# Patient Record
Sex: MALE | Race: WHITE | URBAN - METROPOLITAN AREA
[De-identification: names, ages, dates, MRNs, and addresses within clinical notes are randomized per-mention and may not be internally consistent; named-entity substitution may affect disease eponyms.]

---

## 2017-02-07 ENCOUNTER — TELEPHONE (OUTPATIENT)
Dept: FAMILY MEDICINE | Facility: CLINIC | Age: 32
End: 2017-02-07

## 2017-02-07 NOTE — TELEPHONE ENCOUNTER
Panel Management Review      Patient has the following on his problem list:     Hypertension   Last three blood pressure readings:  BP Readings from Last 3 Encounters:   03/08/16 150/82   10/31/14 136/86   11/01/13 114/78     Blood pressure: Failed    HTN Guidelines:  Age 18-59 BP range:  Less than 140/90  Age 60-85 with Diabetes:  Less than 140/90  Age 60-85 without Diabetes:  less than 150/90      Composite cancer screening  Chart review shows that this patient is due/due soon for the following None  Summary:    Patient is due/failing the following:   BP CHECK and PHYSICAL    Action needed:   Patient needs office visit for BP check and physical.    Type of outreach:    Sent letter.    Questions for provider review:    None                                                                                                                                    Jose Garcia MA     Chart routed to closed.

## 2017-02-07 NOTE — Clinical Note
[facility]  [address] [facility] [id number] [phone]      February 7, 2017      Julio Cardoza  [address] [facility] [address]        Dear Julio,    Our records indicate that you have not scheduled for a(n)Blood pressure and physical appointment which was recommended by your health care team. Monitoring and managing your preventative and chronic health conditions are very important to us.    If you have received your health care elsewhere, please provide us with that information so it can be documented in your chart.    Please call [phone] or message us through your [employer] account to schedule an appointment or provide information for your chart.     I look forward to seeing you and working with you on your health care needs.     Sincerely,       ELMIRA Marcano               *If you have already scheduled an appointment, please disregard this reminder

## 2017-04-21 ENCOUNTER — OFFICE VISIT (OUTPATIENT)
Dept: FAMILY MEDICINE | Facility: CLINIC | Age: 32
End: 2017-04-21
Payer: OTHER GOVERNMENT

## 2017-04-21 VITALS
BODY MASS INDEX: 25.47 KG/M2 | WEIGHT: 170 LBS | HEART RATE: 64 BPM | SYSTOLIC BLOOD PRESSURE: 127 MMHG | DIASTOLIC BLOOD PRESSURE: 80 MMHG | OXYGEN SATURATION: 99 %

## 2017-04-21 DIAGNOSIS — I10 HYPERTENSION, GOAL BELOW 140/90: ICD-10-CM

## 2017-04-21 DIAGNOSIS — R21 RASH: Primary | ICD-10-CM

## 2017-04-21 LAB
ANION GAP SERPL CALCULATED.3IONS-SCNC: 5 MMOL/L (ref 3–14)
BUN SERPL-MCNC: 15 MG/DL (ref 7–30)
CALCIUM SERPL-MCNC: 9 MG/DL (ref 8.5–10.1)
CHLORIDE SERPL-SCNC: 105 MMOL/L (ref 94–109)
CO2 SERPL-SCNC: 30 MMOL/L (ref 20–32)
CREAT SERPL-MCNC: 1.18 MG/DL (ref 0.66–1.25)
GFR SERPL CREATININE-BSD FRML MDRD: 71 ML/MIN/1.7M2
GLUCOSE SERPL-MCNC: 100 MG/DL (ref 70–99)
POTASSIUM SERPL-SCNC: 4.9 MMOL/L (ref 3.4–5.3)
SODIUM SERPL-SCNC: 140 MMOL/L (ref 133–144)

## 2017-04-21 PROCEDURE — 80048 BASIC METABOLIC PNL TOTAL CA: CPT | Performed by: PHYSICIAN ASSISTANT

## 2017-04-21 PROCEDURE — 36415 COLL VENOUS BLD VENIPUNCTURE: CPT | Performed by: PHYSICIAN ASSISTANT

## 2017-04-21 PROCEDURE — 99214 OFFICE O/P EST MOD 30 MIN: CPT | Performed by: PHYSICIAN ASSISTANT

## 2017-04-21 RX ORDER — TRIAMCINOLONE ACETONIDE 5 MG/G
CREAM TOPICAL
Qty: 30 G | Refills: 1 | Status: SHIPPED | OUTPATIENT
Start: 2017-04-21

## 2017-04-21 RX ORDER — LISINOPRIL 10 MG/1
10 TABLET ORAL DAILY
Qty: 90 TABLET | Refills: 1 | Status: SHIPPED | OUTPATIENT
Start: 2017-04-21 | End: 2018-01-11

## 2017-04-21 NOTE — MR AVS SNAPSHOT
"              After Visit Summary   2017    Julio Cardoza    MRN: [id number]           Patient Information     Date Of Birth          1985        Visit Information        Provider Department      2017 2:50 PM Shadi Cabrera PA-C [facility]        Today's Diagnoses     Rash    -  1    Hypertension, goal below 140/90           Follow-ups after your visit        Who to contact     If you have questions or need follow up information about today's clinic visit or your schedule please contact [facility] directly at [phone].  Normal or non-critical lab and imaging results will be communicated to you by [employer], letter or phone within 4 business days after the clinic has received the results. If you do not hear from us within 7 days, please contact the clinic through [employer] or phone. If you have a critical or abnormal lab result, we will notify you by phone as soon as possible.  Submit refill requests through "[employer]" or call your pharmacy and they will forward the refill request to us. Please allow 3 business days for your refill to be completed.          Additional Information About Your Visit        MyChart Information     "[employer]" lets you send messages to your doctor, view your test results, renew your prescriptions, schedule appointments and more. To sign up, go to [email or web address]/"[employer]" . Click on \"Log in\" on the left side of the screen, which will take you to the Welcome page. Then click on \"Sign up Now\" on the right side of the page.     You will be asked to enter the access code listed below, as well as some personal information. Please follow the directions to create your username and password.     Your access code is: [email or web address]  Expires: 2017  3:11 PM     Your access code will  in 90 days. If you need help or a new code, please call your [facility] or [phone].        Care EveryWhere ID     This is your Care EveryWhere ID. This " could be used by other organizations to access your [place] medical records  [id number]        Your Vitals Were     Pulse Pulse Oximetry BMI (Body Mass Index)             64 99% 25.47 kg/m2          Blood Pressure from Last 3 Encounters:   04/21/17 127/80   03/08/16 150/82   10/31/14 136/86    Weight from Last 3 Encounters:   04/21/17 170 lb (77.1 kg)   03/08/16 181 lb 8 oz (82.3 kg)   10/31/14 183 lb (83 kg)              We Performed the Following     Basic metabolic panel          Today's Medication Changes          These changes are accurate as of: 4/21/17  3:12 PM.  If you have any questions, ask your nurse or doctor.               Start taking these medicines.        Dose/Directions    triamcinolone 0.5 % cream   Commonly known as:  KENALOG   Used for:  Rash   Started by:  Shadi Cabrera PA-C        Apply sparingly to affected area three times daily.   Quantity:  30 g   Refills:  1            Where to get your medicines      These medications were sent to [place] [address] [place] [facility] - [place], [place] - [address]  [address] [facility] [address]     Phone:  [phone]     lisinopril 10 MG tablet    triamcinolone 0.5 % cream                Primary Care Provider Office Phone # Fax #    Shadi Cabrera PA-C [phone]       [facility] [id number] [place] [address]        Thank you!     Thank you for choosing [facility] [place]  for your care. Our goal is always to provide you with excellent care. Hearing back from our patients is one way we can continue to improve our services. Please take a few minutes to complete the written survey that you may receive in the mail after your visit with us. Thank you!             Your Updated Medication List - Protect others around you: Learn how to safely use, store and throw away your medicines at www.disposemymeds.org.          This list is accurate as of: 4/21/17  3:12 PM.  Always use your most recent  med list.                   Brand Name Dispense Instructions for use    lisinopril 10 MG tablet    PRINIVIL/ZESTRIL    90 tablet    Take 1 tablet (10 mg) by mouth daily       montelukast 10 MG tablet    SINGULAIR    30 tablet    Take 1 tablet (10 mg) by mouth At Bedtime       triamcinolone 0.5 % cream    KENALOG    30 g    Apply sparingly to affected area three times daily.

## 2017-04-21 NOTE — PROGRESS NOTES
SUBJECTIVE:                                                    Julio Cardoza is a 31 year old male who presents to clinic today for the following health issues:    Hypertension Follow-up      Outpatient blood pressures are not being checked. Will be getting home cuff soon to start checking    Low Salt Diet: low salt       Amount of exercise or physical activity: runs 15 miles a week plus working out an additional 3 days a week    Problems taking medications regularly: No    Medication side effects: none    Diet: low salt, no alcohol, eating healthier options. Has lost 10 lbs in the last 6 weeks    Picking skin of forearms for couple months. Itches. No fevers or discharge. Get board at work and will pick skin.     Problem list and histories reviewed & adjusted, as indicated.  Additional history: as documented      Patient Active Problem List   Diagnosis     Hyperlipidemia LDL goal <160     Hypertension, goal below 140/90     Allergic rhinitis, unspecified allergic rhinitis type     Past Surgical History:   Procedure Laterality Date     ENT SURGERY         Social History   Substance Use Topics     Smoking status: Never Smoker     Smokeless tobacco: Never Used     Alcohol use Yes      Comment: OCC     Family History   Problem Relation Age of Onset     HEART DISEASE Mother      Hypertension Father      CANCER Maternal Grandmother      Hypertension Brother      Hypertension Sister          Current Outpatient Prescriptions   Medication Sig Dispense Refill     lisinopril (PRINIVIL/ZESTRIL) 10 MG tablet Take 1 tablet (10 mg) by mouth daily 90 tablet 1     triamcinolone (KENALOG) 0.5 % cream Apply sparingly to affected area three times daily. 30 g 1     montelukast (SINGULAIR) 10 MG tablet Take 1 tablet (10 mg) by mouth At Bedtime (Patient not taking: Reported on 4/21/2017) 30 tablet 5     [DISCONTINUED] lisinopril (PRINIVIL,ZESTRIL) 10 MG tablet Take 1 tablet (10 mg) by mouth daily 90 tablet 1     Allergies   Allergen  Reactions     Bactrim [Sulfamethoxazole W-Trimethoprim]      BP Readings from Last 3 Encounters:   04/21/17 127/80   03/08/16 150/82   10/31/14 136/86    Wt Readings from Last 3 Encounters:   04/21/17 170 lb (77.1 kg)   03/08/16 181 lb 8 oz (82.3 kg)   10/31/14 183 lb (83 kg)                  Labs reviewed in EPIC    ROS:  Constitutional, HEENT, cardiovascular, pulmonary, gi and gu systems are negative, except as otherwise noted.    OBJECTIVE:                                                    /80  Pulse 64  Wt 170 lb (77.1 kg)  SpO2 99%  BMI 25.47 kg/m2  Body mass index is 25.47 kg/(m^2).  GENERAL: healthy, alert and no distress  EYES: Eyes grossly normal to inspection, PERRL and conjunctivae and sclerae normal  HENT: ear canals and TM's normal, nose and mouth without ulcers or lesions  NECK: no adenopathy, no asymmetry, masses, or scars and thyroid normal to palpation  RESP: lungs clear to auscultation - no rales, rhonchi or wheezes  CV: regular rate and rhythm, normal S1 S2, no S3 or S4, no murmur, click or rub, no peripheral edema and peripheral pulses strong  MS: no gross musculoskeletal defects noted, no edema  SKIN: few scattered small scabs and papules twice a day forearms.     Diagnostic Test Results:  No results found for this or any previous visit (from the past 24 hour(s)).     ASSESSMENT/PLAN:                                                        ICD-10-CM    1. Rash R21 triamcinolone (KENALOG) 0.5 % cream   2. Hypertension, goal below 140/90 I10 lisinopril (PRINIVIL/ZESTRIL) 10 MG tablet     Basic metabolic panel   med refilled  Start crm twice a day and lotion skin twice a day  warning signs discussed.  side effects discussed  Recheck blood pressure in 6 months.     Shadi Cabrera PA-C  [facility]

## 2017-04-21 NOTE — LETTER
[facility]  [address] [facility] [address] [phone]        April 24, 2017    Julio Cardoza  [address] [facility] [address]            Mr. Cardoza,     All of your labs were normal for you.     Please contact the clinic if you have additional questions.  Thank you.     Sincerely,     Shadi Cabrera PA-C    Results for orders placed or performed in visit on 04/21/17   Basic metabolic panel   Result Value Ref Range    Sodium 140 133 - 144 mmol/L    Potassium 4.9 3.4 - 5.3 mmol/L    Chloride 105 94 - 109 mmol/L    Carbon Dioxide 30 20 - 32 mmol/L    Anion Gap 5 3 - 14 mmol/L    Glucose 100 (H) 70 - 99 mg/dL    Urea Nitrogen 15 7 - 30 mg/dL    Creatinine 1.18 0.66 - 1.25 mg/dL    GFR Estimate 71 >60 mL/min/1.7m2    GFR Estimate If Black 86 >60 mL/min/1.7m2    Calcium 9.0 8.5 - 10.1 mg/dL

## 2017-04-21 NOTE — NURSING NOTE
"Chief Complaint   Patient presents with     Hypertension       Initial /80  Pulse 64  Wt 170 lb (77.1 kg)  SpO2 99%  BMI 25.47 kg/m2 Estimated body mass index is 25.47 kg/(m^2) as calculated from the following:    Height as of 10/31/14: 5' 8.5\" (1.74 m).    Weight as of this encounter: 170 lb (77.1 kg).  Medication Reconciliation: complete  Nuzhat Ford CMA    "

## 2017-04-24 NOTE — PROGRESS NOTES
MrErnestina Cardoza,    All of your labs were normal for you.    Please contact the clinic if you have additional questions.  Thank you.    Sincerely,    Shadi Cabrera PA-C

## 2017-11-03 ENCOUNTER — OFFICE VISIT (OUTPATIENT)
Dept: FAMILY MEDICINE | Facility: CLINIC | Age: 32
End: 2017-11-03
Payer: OTHER GOVERNMENT

## 2017-11-03 ENCOUNTER — TELEPHONE (OUTPATIENT)
Dept: FAMILY MEDICINE | Facility: CLINIC | Age: 32
End: 2017-11-03

## 2017-11-03 VITALS
OXYGEN SATURATION: 100 % | BODY MASS INDEX: 26.22 KG/M2 | SYSTOLIC BLOOD PRESSURE: 134 MMHG | HEART RATE: 61 BPM | WEIGHT: 175 LBS | TEMPERATURE: 98.3 F | DIASTOLIC BLOOD PRESSURE: 80 MMHG

## 2017-11-03 DIAGNOSIS — J45.21 MILD INTERMITTENT ASTHMA WITH ACUTE EXACERBATION: ICD-10-CM

## 2017-11-03 DIAGNOSIS — J01.90 ACUTE SINUSITIS WITH SYMPTOMS > 10 DAYS: Primary | ICD-10-CM

## 2017-11-03 PROCEDURE — 99214 OFFICE O/P EST MOD 30 MIN: CPT | Performed by: PHYSICIAN ASSISTANT

## 2017-11-03 RX ORDER — ALBUTEROL SULFATE 90 UG/1
2 AEROSOL, METERED RESPIRATORY (INHALATION) EVERY 6 HOURS PRN
Qty: 1 INHALER | Refills: 1 | Status: SHIPPED | OUTPATIENT
Start: 2017-11-03 | End: 2018-03-13

## 2017-11-03 ASSESSMENT — ENCOUNTER SYMPTOMS
MUSCULOSKELETAL NEGATIVE: 1
EYES NEGATIVE: 1
CONSTITUTIONAL NEGATIVE: 1
CARDIOVASCULAR NEGATIVE: 1
PSYCHIATRIC NEGATIVE: 1
HEMATOLOGIC/LYMPHATIC NEGATIVE: 1
GASTROINTESTINAL NEGATIVE: 1
ENDOCRINE NEGATIVE: 1

## 2017-11-03 NOTE — TELEPHONE ENCOUNTER
Reason for Call:  Same Day Appointment, Requested Provider:  [facility] in [place]      PCP: Shadi Cabrera    Reason for visit: cold sx     Duration of symptoms: 3 weesk     Have you been treated for this in the past? No    Additional comments: Pt would like to be seen today. His son is seeing Peds at 130, he is hoping to be seen around the same time.     Can we leave a detailed message on this number? YES    Phone number patient can be reached at: Home number on file [phone] (home)    Best Time: any     Call taken on 11/3/2017 at 11:56 AM by Benita Scott

## 2017-11-03 NOTE — NURSING NOTE
"Chief Complaint   Patient presents with     Cough     X 3 weeks, congestion, wheezing       Initial /80  Pulse 61  Temp 98.3  F (36.8  C) (Oral)  Wt 175 lb (79.4 kg)  SpO2 100%  BMI 26.22 kg/m2 Estimated body mass index is 26.22 kg/(m^2) as calculated from the following:    Height as of 10/31/14: 5' 8.5\" (1.74 m).    Weight as of this encounter: 175 lb (79.4 kg).  Medication Reconciliation: complete   Lashon South CMA      "

## 2017-11-03 NOTE — MR AVS SNAPSHOT
"              After Visit Summary   11/3/2017    Julio Cardoza    MRN: [id number]           Patient Information     Date Of Birth          1985        Visit Information        Provider Department      11/3/2017 4:00 PM Zohra Bello PA-C [facility]        Today's Diagnoses     Acute sinusitis with symptoms > 10 days    -  1    Mild intermittent asthma with acute exacerbation           Follow-ups after your visit        Who to contact     If you have questions or need follow up information about today's clinic visit or your schedule please contact [facility] directly at [phone].  Normal or non-critical lab and imaging results will be communicated to you by [employer], letter or phone within 4 business days after the clinic has received the results. If you do not hear from us within 7 days, please contact the clinic through [employer] or phone. If you have a critical or abnormal lab result, we will notify you by phone as soon as possible.  Submit refill requests through [employer] or call your pharmacy and they will forward the refill request to us. Please allow 3 business days for your refill to be completed.          Additional Information About Your Visit        MyChart Information     [employer] lets you send messages to your doctor, view your test results, renew your prescriptions, schedule appointments and more. To sign up, go to [email or web address] . Click on \"Log in\" on the left side of the screen, which will take you to the Welcome page. Then click on \"Sign up Now\" on the right side of the page.     You will be asked to enter the access code listed below, as well as some personal information. Please follow the directions to create your username and password.     Your access code is: [email or web address]  Expires: 2018  7:59 PM     Your access code will  in 90 days. If you need help or a new code, please call your [facility] or [phone].        Care " EveryWhere ID     This is your Care EveryWhere ID. This could be used by other organizations to access your [place] medical records  [id number]        Your Vitals Were     Pulse Temperature Pulse Oximetry BMI (Body Mass Index)          61 98.3  F (36.8  C) (Oral) 100% 26.22 kg/m2         Blood Pressure from Last 3 Encounters:   11/03/17 134/80   04/21/17 127/80   03/08/16 150/82    Weight from Last 3 Encounters:   11/03/17 175 lb (79.4 kg)   04/21/17 170 lb (77.1 kg)   03/08/16 181 lb 8 oz (82.3 kg)              Today, you had the following     No orders found for display         Today's Medication Changes          These changes are accurate as of: 11/3/17  7:59 PM.  If you have any questions, ask your nurse or doctor.               Start taking these medicines.        Dose/Directions    albuterol 108 (90 BASE) MCG/ACT Inhaler   Commonly known as:  PROAIR HFA/PROVENTIL HFA/VENTOLIN HFA   Used for:  Mild intermittent asthma with acute exacerbation   Started by:  Zohra Bello PA-C        Dose:  2 puff   Inhale 2 puffs into the lungs every 6 hours as needed for shortness of breath / dyspnea or wheezing   Quantity:  1 Inhaler   Refills:  1       amoxicillin-clavulanate 875-125 MG per tablet   Commonly known as:  AUGMENTIN   Used for:  Acute sinusitis with symptoms > 10 days   Started by:  Zohra Bello PA-C        Dose:  1 tablet   Take 1 tablet by mouth 2 times daily   Quantity:  20 tablet   Refills:  0            Where to get your medicines      These medications were sent to [facility] [address] [place] [facility] [address] [facility]  [address] [address]     Phone:  [phone]     albuterol 108 (90 BASE) MCG/ACT Inhaler    amoxicillin-clavulanate 875-125 MG per tablet                Primary Care Provider Office Phone # Fax #    Shadi Cabrera PA-C [phone] [address] [facility] [address]        Equal Access to Services      SHARMAINE [facility]: Hadii aad ku cesar Malagon, waaxda luqadaha, qaybta kaalmada adeliliana, margarita nimoin hayaatami nagyfer fontaine em . So [facility] [phone].    ATENCIÓN: Si nikola loraine, tiene a hinton disposición servicios gratuitos de asistencia lingüística. Llame al [phone].    We comply with applicable federal civil rights laws and [place] laws. We do not discriminate on the basis of race, color, national origin, age, disability, sex, sexual orientation, or gender identity.            Thank you!     Thank you for choosing [facility]  [facility]. Our goal is always to provide you with excellent care. Hearing back from our patients is one way we can continue to improve our services. Please take a few minutes to complete the written survey that you may receive in the mail after your visit with us. Thank you!             Your Updated Medication List - Protect others around you: Learn how to safely use, store and throw away your medicines at www.disposemymeds.org.          This list is accurate as of: 11/3/17  7:59 PM.  Always use your most recent med list.                   Brand Name Dispense Instructions for use Diagnosis    albuterol 108 (90 BASE) MCG/ACT Inhaler    PROAIR HFA/PROVENTIL HFA/VENTOLIN HFA    1 Inhaler    Inhale 2 puffs into the lungs every 6 hours as needed for shortness of breath / dyspnea or wheezing    Mild intermittent asthma with acute exacerbation       amoxicillin-clavulanate 875-125 MG per tablet    AUGMENTIN    20 tablet    Take 1 tablet by mouth 2 times daily    Acute sinusitis with symptoms > 10 days       lisinopril 10 MG tablet    PRINIVIL/ZESTRIL    90 tablet    Take 1 tablet (10 mg) by mouth daily    Hypertension, goal below 140/90       montelukast 10 MG tablet    SINGULAIR    30 tablet    Take 1 tablet (10 mg) by mouth At Bedtime    Allergic rhinitis, unspecified allergic rhinitis type       triamcinolone 0.5 % cream    KENALOG    30 g    Apply sparingly to  affected area three times daily.    Rash

## 2017-11-03 NOTE — PROGRESS NOTES
SUBJECTIVE:   Julio Cardoza is a 32 year old male who presents to clinic today for the following health issues:      RESPIRATORY SYMPTOMS      Duration: X3 weeks    Description  nasal congestion, rhinorrhea, facial pain/pressure, cough and wheezing    Severity: moderate    Accompanying signs and symptoms: None    History (predisposing factors):  Hx of allergies    Precipitating or alleviating factors: None    Therapies tried and outcome:  Mucinex    Facial pressure is starting to improve  Coughing up phlegm  He has postnasal drip and a cough in the morning  Worse when running last night  He is wheezing at night - he has a history of asthma  He cannot find his albuterol inhaler    Problem list and histories reviewed & adjusted, as indicated.  Additional history: as documented    Patient Active Problem List   Diagnosis     Hyperlipidemia LDL goal <160     Hypertension, goal below 140/90     Allergic rhinitis, unspecified allergic rhinitis type     Past Surgical History:   Procedure Laterality Date     ENT SURGERY         Social History   Substance Use Topics     Smoking status: Never Smoker     Smokeless tobacco: Never Used     Alcohol use Yes      Comment: OCC     Family History   Problem Relation Age of Onset     HEART DISEASE Mother      Hypertension Father      CANCER Maternal Grandmother      Hypertension Brother      Hypertension Sister          Current Outpatient Prescriptions   Medication Sig Dispense Refill     lisinopril (PRINIVIL/ZESTRIL) 10 MG tablet Take 1 tablet (10 mg) by mouth daily 90 tablet 1     triamcinolone (KENALOG) 0.5 % cream Apply sparingly to affected area three times daily. (Patient not taking: Reported on 11/3/2017) 30 g 1     montelukast (SINGULAIR) 10 MG tablet Take 1 tablet (10 mg) by mouth At Bedtime (Patient not taking: Reported on 4/21/2017) 30 tablet 5     Allergies   Allergen Reactions     Bactrim [Sulfamethoxazole W-Trimethoprim]          Reviewed and updated as needed this  visit by clinical staff     Reviewed and updated as needed this visit by Provider         Review of Systems   Constitutional: Negative.    HENT:        As in HPI   Eyes: Negative.    Respiratory:        As in HPI   Cardiovascular: Negative.    Gastrointestinal: Negative.    Endocrine: Negative.    Genitourinary: Negative.    Musculoskeletal: Negative.    Skin: Negative.    Hematological: Negative.    Psychiatric/Behavioral: Negative.          OBJECTIVE:     /80  Pulse 61  Temp 98.3  F (36.8  C) (Oral)  Wt 175 lb (79.4 kg)  SpO2 100%  BMI 26.22 kg/m2  Body mass index is 26.22 kg/(m^2).  GENERAL: healthy, alert and no distress  EYES: Eyes grossly normal to inspection, PERRL and conjunctivae and sclerae normal  HENT: ear canals and TM's normal, nose and mouth without ulcers or lesions  NECK: no adenopathy, no asymmetry, masses  RESP: lungs clear to auscultation - no rales, rhonchi or wheezes  CV: regular rate and rhythm, normal S1 S2, no murmur  MS: no gross musculoskeletal defects noted, no edema  SKIN: no suspicious lesions or rashes  NEURO: Normal strength and tone, mentation intact and speech normal    Diagnostic Test Results:  none     ASSESSMENT/PLAN:     1. Acute sinusitis with symptoms > 10 days  - amoxicillin-clavulanate (AUGMENTIN) 875-125 MG per tablet; Take 1 tablet by mouth 2 times daily  Dispense: 20 tablet; Refill: 0    2. Mild intermittent asthma with acute exacerbation  - albuterol (PROAIR HFA/PROVENTIL HFA/VENTOLIN HFA) 108 (90 BASE) MCG/ACT Inhaler; Inhale 2 puffs into the lungs every 6 hours as needed for shortness of breath / dyspnea or wheezing  Dispense: 1 Inhaler; Refill: 1      Zohra Bello PA-C  [facility]

## 2017-11-03 NOTE — TELEPHONE ENCOUNTER
Julio Cardoza is a 32 year old male who calls with cough/cold symptoms.    NURSING ASSESSMENT:  Description: I spoke with patient. He has had a phlegmy cough with yellow/green/sometimes clear sputum.   Onset/duration:  3 weeks  Precip. factors:  Has a history of seasonal allergies and asthma.   Associated symptoms:  Sinus pressure and mild wheezing  Improves/worsens symptoms:  Was improving but went running last night and is now worse again. Taking Mucinex every several days  Allergies:   Allergies   Allergen Reactions     Bactrim [Sulfamethoxazole W-Trimethoprim]        RECOMMENDED DISPOSITION: Offered work in as requested, but would be a wait as all the providers have a meeting from 2-3. Appointment scheduled this afternoon in [place].  Will comply with recommendation: YES   If further questions/concerns or if Sx do not improve, worsen or new Sx develop, call your PCP or [facility] as soon as possible.    NOTES:  Disposition was determined by the first positive assessment question, therefore all previous assessment questions were negative.     Guideline used:  Telephone Triage Protocols for Nurses, Fifth Edition, Dorys Retana, RN, BSN

## 2017-12-12 ENCOUNTER — TELEPHONE (OUTPATIENT)
Dept: FAMILY MEDICINE | Facility: CLINIC | Age: 32
End: 2017-12-12

## 2017-12-12 NOTE — TELEPHONE ENCOUNTER
Panel Management Review      Patient has the following on his problem list:     Asthma review   No flowsheet data found.   1. Is Asthma diagnosis on the Problem List? No   2. Is Asthma listed on Health Maintenance? Yes    3. Patient is due for:  ACT and AAP    Hypertension   Last three blood pressure readings:  BP Readings from Last 3 Encounters:   11/03/17 134/80   04/21/17 127/80   03/08/16 150/82     Blood pressure: Passed    HTN Guidelines:  Age 18-59 BP range:  Less than 140/90  Age 60-85 with Diabetes:  Less than 140/90  Age 60-85 without Diabetes:  less than 150/90          Composite cancer screening  Chart review shows that this patient is due/due soon for the following None  Summary:    Patient is due/failing the following:   AAP and ACT    Action needed:   Patient needs to do ACT.    Type of outreach:    Sent letter.    Questions for provider review:    Pt needs AAP done and asthma added to problem list                                                                                                                                     Nuzhat Ford CMA       Chart routed to Provider .

## 2017-12-12 NOTE — LETTER
My Asthma Action Plan  Name: Julio Cardoza   YOB: 1985  Date: 12/12/2017   My doctor: Shadi Cabrera PA-C   My clinic: [facility]      My Control Medicine: { :980855}  My Rescue Medicine: { :013020}  {AAP include Oral Steroid:820312} My Asthma Severity: { :711638}  My Best Peak Flow Number:***  Avoid your asthma triggers: { :259124}        {Is patient a child or adult?:863160}       GREEN ZONE   Good Control    I feel good    No cough or wheeze    Can work, sleep and play without asthma symptoms    My Peak Flow number is above *** (>80% of Best)       Take your asthma control medicine every day.     1. If exercise triggers your asthma, take your rescue medication    15 minutes before exercise or sports, and    During exercise if you have asthma symptoms  2. Spacer to use with inhaler: If you have a spacer, make sure to use it with your inhaler             YELLOW ZONE Getting Worse  I have ANY of these:    I do not feel good    Cough or wheeze    Chest feels tight    Wake up at night    My Peak Flow number is between *** (50%) and *** (80%)   1. Keep taking your Green Zone medications  2. Start taking your rescue medicine:    every 20 minutes for up to 1 hour. Then every 4 hours for 24-48 hours.  3. If you stay in the Yellow Zone for more than 12-24 hours, contact your doctor.  4. If you do not return to the Green Zone in 12-24 hours or you get worse, start taking your oral steroid medicine if prescribed by your provider.           RED ZONE Medical Alert - Get Help  I have ANY of these:    I feel awful    Medicine is not helping    Breathing getting harder    Trouble walking or talking    Nose opens wide to breathe    My Peak Flow number is below *** (50%)       1. Take your rescue medicine NOW  2. If your provider has prescribed an oral steroid medicine, start taking it NOW  3. Call your doctor NOW  4. If you are still in the Red Zone after 20 minutes and you have not reached  your doctor:    Take your rescue medicine again and    Call 911 or go to the emergency room right away    See your regular doctor within 2 weeks of an Emergency Room or Urgent Care visit for follow-up treatment.        Electronically signed by: Nuzhat Gonzalez, December 12, 2017    Annual Reminders:  Meet with Asthma Educator,  Flu Shot in the Fall, consider Pneumonia Vaccination for patients with asthma (aged 19 and older).    Pharmacy:    [facility] [address] [place] [facility] [place] [place] [facility] [address] [address] [place] [facility] [address] [address] [place] [facility] [address] [facility]                    Asthma Triggers  How To Control Things That Make Your Asthma Worse    Triggers are things that make your asthma worse.  Look at the list below to help you find your triggers and what you can do about them.  You can help prevent asthma flare-ups by staying away from your triggers.      Trigger                                                          What you can do   Cigarette Smoke  Tobacco smoke can make asthma worse. Do not allow smoking in your home, car or around you.  Be sure no one smokes at a child s day care or school.  If you smoke, ask your health care provider for ways to help you quit.  Ask family members to quit too.  Ask your health care provider for a referral to Quit Plan to help you quit smoking, or call 4-906-037-PLAN.     Colds, Flu, Bronchitis  These are common triggers of asthma. Wash your hands often.  Don t touch your eyes, nose or mouth.  Get a flu shot every year.     Dust Mites  These are tiny bugs that live in cloth or carpet. They are too small to see. Wash sheets and blankets in hot water every week.   Encase pillows and mattress in dust mite proof covers.  Avoid having carpet if you can. If you have carpet, vacuum weekly.   Use a dust mask and HEPA vacuum.   Pollen and Outdoor Mold  Some people are allergic to trees, grass, or weed pollen, or molds. Try  to keep your windows closed.  Limit time out doors when pollen count is high.   Ask you health care provider about taking medicine during allergy season.     Animal Dander  Some people are allergic to skin flakes, urine or saliva from pets with fur or feathers. Keep pets with fur or feathers out of your home.    If you can t keep the pet outdoors, then keep the pet out of your bedroom.  Keep the bedroom door closed.  Keep pets off cloth furniture and away from stuffed toys.     Mice, Rats, and Cockroaches  Some people are allergic to the waste from these pests.   Cover food and garbage.  Clean up spills and food crumbs.  Store grease in the refrigerator.   Keep food out of the bedroom.   Indoor Mold  This can be a trigger if your home has high moisture. Fix leaking faucets, pipes, or other sources of water.   Clean moldy surfaces.  Dehumidify basement if it is damp and smelly.   Smoke, Strong Odors, and Sprays  These can reduce air quality. Stay away from strong odors and sprays, such as perfume, powder, hair spray, paints, smoke incense, paint, cleaning products, candles and new carpet.   Exercise or Sports  Some people with asthma have this trigger. Be active!  Ask your doctor about taking medicine before sports or exercise to prevent symptoms.    Warm up for 5-10 minutes before and after sports or exercise.     Other Triggers of Asthma  Cold air:  Cover your nose and mouth with a scarf.  Sometimes laughing or crying can be a trigger.  Some medicines and food can trigger asthma.

## 2017-12-12 NOTE — LETTER
December 12, 2017      Julio Cardoza  [address] [address]      Dear Julio,     Your clinic record indicates that you are due for an asthma update. We have a survey tool called an ACT (or Asthma Control Test) we use to measure the level of control of your asthma. Please complete the enclosed questionnaire and mail it back to us in the self-addressed stamped envelope.     If you have questions about this letter please contact your provider.     Sincerely,       Your [facility] Team

## 2017-12-13 PROBLEM — J45.20 MILD INTERMITTENT ASTHMA: Status: ACTIVE | Noted: 2017-12-13

## 2018-01-11 ENCOUNTER — TELEPHONE (OUTPATIENT)
Dept: FAMILY MEDICINE | Facility: CLINIC | Age: 33
End: 2018-01-11

## 2018-01-11 DIAGNOSIS — I10 HYPERTENSION, GOAL BELOW 140/90: ICD-10-CM

## 2018-01-11 RX ORDER — LISINOPRIL 10 MG/1
10 TABLET ORAL DAILY
Qty: 90 TABLET | Refills: 0 | Status: SHIPPED | OUTPATIENT
Start: 2018-01-11 | End: 2018-03-13

## 2018-01-11 NOTE — TELEPHONE ENCOUNTER
:;  Please mail patient an ACT with a self addressed stamped envelope.  He states will complete and mail back    Is aware prescription sent to his pharmacy and due for appointment in April before next refill.  Basia Lyons RN

## 2018-01-11 NOTE — LETTER
[facility] [place] [facility]  [address] [facility] [id number] [phone]    January 11, 2018      Julio Cardoza  [address] [address] [address]      Dear Julio,     Your clinic record indicates that you are due for an asthma update. We have a survey tool called an ACT (or Asthma Control Test) we use to measure the level of control of your asthma. Please complete the enclosed questionnaire and mail it back to us in the self-addressed stamped envelope.     If you have questions about this letter please contact your provider.     Sincerely,       Your [facility] Team

## 2018-02-03 ASSESSMENT — ASTHMA QUESTIONNAIRES: ACT_TOTALSCORE: 22

## 2018-03-13 ENCOUNTER — OFFICE VISIT (OUTPATIENT)
Dept: FAMILY MEDICINE | Facility: CLINIC | Age: 33
End: 2018-03-13
Payer: OTHER GOVERNMENT

## 2018-03-13 VITALS
HEART RATE: 60 BPM | DIASTOLIC BLOOD PRESSURE: 85 MMHG | SYSTOLIC BLOOD PRESSURE: 138 MMHG | WEIGHT: 170 LBS | TEMPERATURE: 98.5 F | BODY MASS INDEX: 25.47 KG/M2 | RESPIRATION RATE: 24 BRPM | OXYGEN SATURATION: 99 %

## 2018-03-13 DIAGNOSIS — J30.2 SEASONAL ALLERGIC RHINITIS, UNSPECIFIED CHRONICITY, UNSPECIFIED TRIGGER: ICD-10-CM

## 2018-03-13 DIAGNOSIS — I10 HYPERTENSION, GOAL BELOW 140/90: ICD-10-CM

## 2018-03-13 DIAGNOSIS — H65.196 OTHER RECURRENT ACUTE NONSUPPURATIVE OTITIS MEDIA OF BOTH EARS: Primary | ICD-10-CM

## 2018-03-13 DIAGNOSIS — J45.21 MILD INTERMITTENT ASTHMA WITH ACUTE EXACERBATION: ICD-10-CM

## 2018-03-13 PROCEDURE — 99214 OFFICE O/P EST MOD 30 MIN: CPT | Performed by: FAMILY MEDICINE

## 2018-03-13 RX ORDER — LISINOPRIL 10 MG/1
10 TABLET ORAL DAILY
Qty: 90 TABLET | Refills: 1 | Status: SHIPPED | OUTPATIENT
Start: 2018-03-13 | End: 2018-12-22

## 2018-03-13 RX ORDER — ALBUTEROL SULFATE 90 UG/1
2 AEROSOL, METERED RESPIRATORY (INHALATION) EVERY 6 HOURS PRN
Qty: 2 INHALER | Refills: 1 | Status: SHIPPED | OUTPATIENT
Start: 2018-03-13 | End: 2019-02-12

## 2018-03-13 RX ORDER — CEFUROXIME AXETIL 500 MG/1
500 TABLET ORAL 2 TIMES DAILY
Qty: 20 TABLET | Refills: 0 | Status: SHIPPED | OUTPATIENT
Start: 2018-03-13 | End: 2019-02-12

## 2018-03-13 ASSESSMENT — PAIN SCALES - GENERAL: PAINLEVEL: SEVERE PAIN (6)

## 2018-03-13 NOTE — MR AVS SNAPSHOT
After Visit Summary   3/13/2018    Julio Cardoza    MRN: [id number]           Patient Information     Date Of Birth          1985        Visit Information        Provider Department      3/13/2018 3:50 PM Alfonso Harris MD [facility]        Today's Diagnoses     Other recurrent acute nonsuppurative otitis media of both ears    -  1    Hypertension, goal below 140/90        Mild intermittent asthma with acute exacerbation        Seasonal allergic rhinitis, unspecified chronicity, unspecified trigger           Follow-ups after your visit        Additional Services     ALLERGY/ASTHMA ADULT REFERRAL       Your provider has referred you to: [facility]: [facility]  [phone] [email or web address]    Please be aware that coverage of these services is subject to the terms and limitations of your health insurance plan.  Call member services at your health plan with any benefit or coverage questions.      Please bring the following with you to your appointment:    (1) Any X-Rays, CTs or MRIs which have been performed.  Contact the facility where they were done to arrange for  prior to your scheduled appointment.    (2) List of current medications  (3) This referral request   (4) Any documents/labs given to you for this referral                  Who to contact     If you have questions or need follow up information about today's clinic visit or your schedule please contact [facility] directly at [phone].  Normal or non-critical lab and imaging results will be communicated to you by MyChart, letter or phone within 4 business days after the clinic has received the results. If you do not hear from us within 7 days, please contact the clinic through MyChart or phone. If you have a critical or abnormal lab result, we will notify you by phone as soon as possible.  Submit refill requests through [employer] or call your pharmacy and  "they will forward the refill request to us. Please allow 3 business days for your refill to be completed.          Additional Information About Your Visit        [employer] Information     [employer] lets you send messages to your doctor, view your test results, renew your prescriptions, schedule appointments and more. To sign up, go to [email or web address] . Click on \"Log in\" on the left side of the screen, which will take you to the Welcome page. Then click on \"Sign up Now\" on the right side of the page.     You will be asked to enter the access code listed below, as well as some personal information. Please follow the directions to create your username and password.     Your access code is: [email or web address]  Expires: 2018  4:23 PM     Your access code will  in 90 days. If you need help or a new code, please call your [facility] clinic or [phone].        Care EveryWhere ID     This is your Care EveryWhere ID. This could be used by other organizations to access your [facility] medical records  [id number]        Your Vitals Were     Pulse Temperature Respirations Pulse Oximetry BMI (Body Mass Index)       60 98.5  F (36.9  C) (Oral) 24 99% 25.47 kg/m2        Blood Pressure from Last 3 Encounters:   18 138/85   17 134/80   17 127/80    Weight from Last 3 Encounters:   18 170 lb (77.1 kg)   17 175 lb (79.4 kg)   17 170 lb (77.1 kg)              We Performed the Following     ALLERGY/ASTHMA ADULT REFERRAL          Today's Medication Changes          These changes are accurate as of 3/13/18  4:23 PM.  If you have any questions, ask your nurse or doctor.               Start taking these medicines.        Dose/Directions    cefuroxime 500 MG tablet   Commonly known as:  CEFTIN   Used for:  Other recurrent acute nonsuppurative otitis media of both ears   Started by:  Alfonso Harris MD        Dose:  500 mg   Take 1 tablet (500 mg) by mouth 2 times daily Antibiotic for ear " infection   Quantity:  20 tablet   Refills:  0         These medicines have changed or have updated prescriptions.        Dose/Directions    lisinopril 10 MG tablet   Commonly known as:  PRINIVIL/ZESTRIL   This may have changed:  additional instructions   Used for:  Hypertension, goal below 140/90   Changed by:  Alfonso Harris MD        Dose:  10 mg   Take 1 tablet (10 mg) by mouth daily For blood pressure Pharmacy ok to hold prescription until due   Quantity:  90 tablet   Refills:  1            Where to get your medicines      These medications were sent to [facility] [address] [address] [facility] [address] [place]  [address] [facility] [address]     Phone:  [phone]     albuterol 108 (90 BASE) MCG/ACT Inhaler    cefuroxime 500 MG tablet    lisinopril 10 MG tablet                Primary Care Provider Office Phone # Fax #    Shadi Cabrera PA-C [phone] [address] [facility] [address]        Equal Access to Services     SHARMAINE MCCRACKEN AH: Hadii aad ku hadasho Soomaali, waaxda luqadaha, qaybta kaalmada adeegyada, waxay idiin hayaan breanna strickland . So [facility] [phone].    ATENCIÓN: Si habla español, tiene a hinton disposición servicios gratuitos de asistencia lingüística. Llame al [phone].    We comply with applicable federal civil rights laws and [place] laws. We do not discriminate on the basis of race, color, national origin, age, disability, sex, sexual orientation, or gender identity.            Thank you!     Thank you for choosing [facility]  for your care. Our goal is always to provide you with excellent care. Hearing back from our patients is one way we can continue to improve our services. Please take a few minutes to complete the written survey that you may receive in the mail after your visit with us. Thank you!             Your Updated Medication List - Protect others around you: Learn how to safely use, store and throw away  your medicines at www.disposemymeds.org.          This list is accurate as of 3/13/18  4:23 PM.  Always use your most recent med list.                   Brand Name Dispense Instructions for use Diagnosis    albuterol 108 (90 BASE) MCG/ACT Inhaler    PROAIR HFA/PROVENTIL HFA/VENTOLIN HFA    2 Inhaler    Inhale 2 puffs into the lungs every 6 hours as needed for shortness of breath / dyspnea or wheezing    Mild intermittent asthma with acute exacerbation       cefuroxime 500 MG tablet    CEFTIN    20 tablet    Take 1 tablet (500 mg) by mouth 2 times daily Antibiotic for ear infection    Other recurrent acute nonsuppurative otitis media of both ears       lisinopril 10 MG tablet    PRINIVIL/ZESTRIL    90 tablet    Take 1 tablet (10 mg) by mouth daily For blood pressure Pharmacy ok to hold prescription until due    Hypertension, goal below 140/90       triamcinolone 0.5 % cream    KENALOG    30 g    Apply sparingly to affected area three times daily.    Rash

## 2018-03-13 NOTE — NURSING NOTE
"Chief Complaint   Patient presents with     Ear Problem       Initial /85  Pulse 60  Temp 98.5  F (36.9  C) (Oral)  Resp 24  Wt 170 lb (77.1 kg)  SpO2 99%  BMI 25.47 kg/m2 Estimated body mass index is 25.47 kg/(m^2) as calculated from the following:    Height as of 10/31/14: 5' 8.5\" (1.74 m).    Weight as of this encounter: 170 lb (77.1 kg).    Connie Marte CMA    "

## 2018-03-13 NOTE — PROGRESS NOTES
SUBJECTIVE:  Julio Cardoza, a 32 year old male scheduled an appointment to discuss the following issues:  Both ears have pain and drainage 3rd day.    He would like to talk about allergy medicatio  Plugged and painful and discharge. No swimming/airplane. History seasonal ALLERGIC RHINITIS.   Sinus congestion in AM. No sneezing or itchy eyes.   Occasionally sudafed. No claritin/allergy. flonase in past. Albuterol - in allergies season and exercise. No singulair - wasn't really working.   Asthma issues stable. No prednisone in past or major attacks. No fevers or chills. No sore throat.     Past Medical History:   Diagnosis Date     NO ACTIVE PROBLEMS      Seasonal allergies        Past Surgical History:   Procedure Laterality Date     ENT SURGERY         Family History   Problem Relation Age of Onset     HEART DISEASE Mother      Hypertension Father      CANCER Maternal Grandmother      Hypertension Brother      Hypertension Sister        Social History   Substance Use Topics     Smoking status: Never Smoker     Smokeless tobacco: Never Used     Alcohol use Yes      Comment: OCC       ROS:  All other ROS negative    OBJECTIVE:  /85  Pulse 60  Temp 98.5  F (36.9  C) (Oral)  Resp 24  Wt 170 lb (77.1 kg)  SpO2 99%  BMI 25.47 kg/m2  EXAM:  GENERAL APPEARANCE: healthy, alert and no distress  EYES: EOMI,  PERRL  HENT: ear canals and TM's normal red bilaterally and nose clear discharge and mouth without ulcers or lesions  NECK: no adenopathy, no asymmetry, masses, or scars and thyroid normal to palpation  RESP: lungs clear to auscultation - no rales, rhonchi or wheezes  CV: regular rates and rhythm, normal S1 S2, no S3 or S4 and no murmur, click or rub -  ABDOMEN:  soft, nontender, no HSM or masses and bowel sounds normal  MS: extremities normal- no gross deformities noted, no evidence of inflammation in joints, FROM in all extremities.  NEURO: Normal strength and tone, sensory exam grossly normal, mentation  intact and speech normal  PSYCH: mentation appears normal and affect normal/bright    ASSESSMENT / PLAN:  (H65.196) Other recurrent acute nonsuppurative otitis media of both ears  (primary encounter diagnosis)  Plan: cefuroxime (CEFTIN) 500 MG tablet        augmentin ok but pills too big. Reveiwed risks and side effects of medication  Follow-up ENT if persists. Expected course and warning signs reviewed. Call/email with questions/concerns.     (I10) Hypertension, goal below 140/90  Comment: stable.   Plan: lisinopril (PRINIVIL/ZESTRIL) 10 MG tablet        Patient wanted refill - didn't really discuss much. Exercise and self-monitor. Needs fasting labs in 3-4 months. Return to clinic sooner if worse.     (J45.21) Mild intermittent asthma with acute exacerbation  Comment: stable  Plan: albuterol (PROAIR HFA/PROVENTIL HFA/VENTOLIN         HFA) 108 (90 BASE) MCG/ACT Inhaler,         ALLERGY/ASTHMA ADULT REFERRAL        Prn.     (J30.2) Seasonal allergic rhinitis, unspecified chronicity, unspecified trigger  Plan: ALLERGY/ASTHMA ADULT REFERRAL        Over the counter flonase and zyrtec prn. Can see allergist if desired.     Alfonso Harris

## 2018-04-23 ENCOUNTER — OFFICE VISIT (OUTPATIENT)
Dept: UROLOGY | Facility: CLINIC | Age: 33
End: 2018-04-23
Payer: OTHER GOVERNMENT

## 2018-04-23 VITALS
WEIGHT: 170 LBS | BODY MASS INDEX: 24.34 KG/M2 | SYSTOLIC BLOOD PRESSURE: 124 MMHG | HEIGHT: 70 IN | DIASTOLIC BLOOD PRESSURE: 66 MMHG | HEART RATE: 68 BPM

## 2018-04-23 DIAGNOSIS — Z30.2 ENCOUNTER FOR STERILIZATION: Primary | ICD-10-CM

## 2018-04-23 PROCEDURE — 99202 OFFICE O/P NEW SF 15 MIN: CPT | Performed by: UROLOGY

## 2018-04-23 RX ORDER — DIAZEPAM 10 MG
10 TABLET ORAL EVERY 6 HOURS PRN
Qty: 1 TABLET | Refills: 0 | Status: SHIPPED | OUTPATIENT
Start: 2018-04-23 | End: 2019-02-12

## 2018-04-23 ASSESSMENT — PAIN SCALES - GENERAL: PAINLEVEL: NO PAIN (0)

## 2018-04-23 NOTE — MR AVS SNAPSHOT
"              After Visit Summary   2018    Julio Cardoza    MRN: [id number]           Patient Information     Date Of Birth          1985        Visit Information        Provider Department      2018 1:00 PM Wicho Barnett MD [facility]        Today's Diagnoses     Encounter for sterilization    -  1       Follow-ups after your visit        Follow-up notes from your care team     Return for vasectomy.      Who to contact     If you have questions or need follow up information about today's clinic visit or your schedule please contact [facility] directly at [phone].  Normal or non-critical lab and imaging results will be communicated to you by [facility] [employer], letter or phone within 4 business days after the clinic has received the results. If you do not hear from us within 7 days, please contact the clinic through [facility] [employer] or phone. If you have a critical or abnormal lab result, we will notify you by phone as soon as possible.  Submit refill requests through [facility] or call your pharmacy and they will forward the refill request to us. Please allow 3 business days for your refill to be completed.          Additional Information About Your Visit        MyChart Information     [facility] lets you send messages to your doctor, view your test results, renew your prescriptions, schedule appointments and more. To sign up, go to [email or web address] . Click on \"Log in\" on the left side of the screen, which will take you to the Welcome page. Then click on \"Sign up Now\" on the right side of the page.     You will be asked to enter the access code listed below, as well as some personal information. Please follow the directions to create your username and password.     Your access code is: [email or web address]  Expires: 2018  4:23 PM     Your access code will  in 90 days. If you need help or a new code, please call your " "[facility] or [phone].        Care EveryWhere ID     This is your Care EveryWhere ID. This could be used by other organizations to access your [place] medical records  [id number]        Your Vitals Were     Pulse Height BMI (Body Mass Index)             68 1.778 m (5' 10\") 24.39 kg/m2          Blood Pressure from Last 3 Encounters:   04/23/18 124/66   03/13/18 138/85   11/03/17 134/80    Weight from Last 3 Encounters:   04/23/18 77.1 kg (170 lb)   03/13/18 77.1 kg (170 lb)   11/03/17 79.4 kg (175 lb)              Today, you had the following     No orders found for display         Today's Medication Changes          These changes are accurate as of 4/23/18  1:45 PM.  If you have any questions, ask your nurse or doctor.               Start taking these medicines.        Dose/Directions    diazepam 10 MG tablet   Commonly known as:  VALIUM   Used for:  Encounter for sterilization   Started by:  Wicho Barnett MD        Dose:  10 mg   Take 1 tablet (10 mg) by mouth every 6 hours as needed for anxiety or sleep Take 30-60 minutes before procedure.  Do not operate a vehicle after taking this medication.   Quantity:  1 tablet   Refills:  0            Where to get your medicines      Some of these will need a paper prescription and others can be bought over the counter.  Ask your nurse if you have questions.     Bring a paper prescription for each of these medications     diazepam 10 MG tablet                Primary Care Provider Office Phone # Fax #    Shadi Cabrera PA-C [phone] [address]  [facility] [address]        Equal Access to Services     [facility]: Hadii latasha guerrero Sobrien, waaxda luqadaha, qaybta kaalmamargarita marques . So [facility] [phone].    ATENCIÓN: Si habla español, tiene a hinton disposición servicios gratuitos de asistencia lingüística. Llame al [phone].    We comply with applicable federal civil " rights laws and [place] laws. We do not discriminate on the basis of race, color, national origin, age, disability, sex, sexual orientation, or gender identity.            Thank you!     Thank you for choosing [facility]  for your care. Our goal is always to provide you with excellent care. Hearing back from our patients is one way we can continue to improve our services. Please take a few minutes to complete the written survey that you may receive in the mail after your visit with us. Thank you!             Your Updated Medication List - Protect others around you: Learn how to safely use, store and throw away your medicines at www.disposemymeds.org.          This list is accurate as of 4/23/18  1:45 PM.  Always use your most recent med list.                   Brand Name Dispense Instructions for use Diagnosis    albuterol 108 (90 Base) MCG/ACT Inhaler    PROAIR HFA/PROVENTIL HFA/VENTOLIN HFA    2 Inhaler    Inhale 2 puffs into the lungs every 6 hours as needed for shortness of breath / dyspnea or wheezing    Mild intermittent asthma with acute exacerbation       cefuroxime 500 MG tablet    CEFTIN    20 tablet    Take 1 tablet (500 mg) by mouth 2 times daily Antibiotic for ear infection    Other recurrent acute nonsuppurative otitis media of both ears       diazepam 10 MG tablet    VALIUM    1 tablet    Take 1 tablet (10 mg) by mouth every 6 hours as needed for anxiety or sleep Take 30-60 minutes before procedure.  Do not operate a vehicle after taking this medication.    Encounter for sterilization       lisinopril 10 MG tablet    PRINIVIL/ZESTRIL    90 tablet    Take 1 tablet (10 mg) by mouth daily For blood pressure Pharmacy ok to hold prescription until due    Hypertension, goal below 140/90       triamcinolone 0.5 % cream    KENALOG    30 g    Apply sparingly to affected area three times daily.    Rash

## 2018-04-23 NOTE — LETTER
4/23/2018       RE: Julio Cardoza  [address] [id number]     Dear Colleague,    Thank you for referring your patient, Julio Cardoza, to the [facility] at [facility]. Please see a copy of my visit note below.    History: It is a great pleasure to see this very pleasant 32-year-old gentleman in initial consultation today.  He is in good health.  He wishes to have a vasectomy.  He is  with 2 young children.  His wife is in full agreement with the plans for vasectomy.  He does have mild hypertension but no other major health related issues.  There is no history of previous surgery significance in the lower abdomen    Past Medical History:   Diagnosis Date     NO ACTIVE PROBLEMS      Seasonal allergies        Past Surgical History:   Procedure Laterality Date     ENT SURGERY         Family History   Problem Relation Age of Onset     HEART DISEASE Mother      Hypertension Father      CANCER Maternal Grandmother      Hypertension Brother      Hypertension Sister        Social History     Social History     Marital status:      Spouse name: N/A     Number of children: N/A     Years of education: N/A     Occupational History     Not on file.     Social History Main Topics     Smoking status: Never Smoker     Smokeless tobacco: Never Used     Alcohol use Yes      Comment: OCC     Drug use: No     Sexual activity: Yes     Partners: Female     Other Topics Concern      Service No     Blood Transfusions No     Caffeine Concern No     Occupational Exposure No     Hobby Hazards No     Sleep Concern No     Stress Concern No     Weight Concern No     Special Diet No     Back Care No     Exercise Yes     Bike Helmet Yes     Seat Belt Yes     Self-Exams Yes     Social History Narrative       Current Outpatient Prescriptions   Medication Sig Dispense Refill     albuterol (PROAIR HFA/PROVENTIL HFA/VENTOLIN HFA)  "108 (90 BASE) MCG/ACT Inhaler Inhale 2 puffs into the lungs every 6 hours as needed for shortness of breath / dyspnea or wheezing 2 Inhaler 1     cefuroxime (CEFTIN) 500 MG tablet Take 1 tablet (500 mg) by mouth 2 times daily Antibiotic for ear infection 20 tablet 0     diazepam (VALIUM) 10 MG tablet Take 1 tablet (10 mg) by mouth every 6 hours as needed for anxiety or sleep Take 30-60 minutes before procedure.  Do not operate a vehicle after taking this medication. 1 tablet 0     lisinopril (PRINIVIL/ZESTRIL) 10 MG tablet Take 1 tablet (10 mg) by mouth daily For blood pressure Pharmacy ok to hold prescription until due 90 tablet 1     triamcinolone (KENALOG) 0.5 % cream Apply sparingly to affected area three times daily. 30 g 1       Review Of Systems:  Skin: negative  Eyes: negative  Ears/Nose/Throat: negative  Respiratory: No shortness of breath, dyspnea on exertion, cough, or hemoptysis  Cardiovascular: Essential hypertension  Gastrointestinal: negative  Genitourinary: negative  Musculoskeletal: negative  Neurologic: negative  Psychiatric: negative  Hematologic/Lymphatic/Immunologic: negative  Endocrine: negative    Exam:  /66 (BP Location: Left arm)  Pulse 68  Ht 1.778 m (5' 10\")  Wt 77.1 kg (170 lb)  BMI 24.39 kg/m2    General Impression: Very pleasant young man in no acute distress, well oriented in time place and person.    Mental status.  Normal    HEENT: There is no clinical evidence of jaundice, the mucous membranes are normal.  The neck and thyroid glands are normal    Skin: Skin is otherwise normal to examination    Lymph Nodes: There is no inguinal lymphadenopathy    Respiratory System: The respiratory cycle is normal    Cardiovascular: Not examined    Abdominal: Unremarkable abdominal examination without evidence of inguinal hernia    Extremities: No significant peripheral edema    Back and Flank: Not examined    Genital: Penis uncircumcised with normal size meatus.  Both vas deferens easily " "palpable.  No other significant features    Rectal: Not examined    Neurologic: There are no focal abnormal clinical neurological signs in the central, or peripheral nervous systems    Impression: I discussed the procedure of vasectomy with the patient in detail today.  I went over all the potential side effects, complications and expectations from the procedure.  I explained the importance of not discontinuing normal contraceptive precautions until we have demonstrated a 0 sperm count after the procedure.  I answered all his questions    Plan: Vasectomy    \"This dictation was performed with voice recognition software and may contain errors,  omissions and inadvertent word substitution.\"      Again, thank you for allowing me to participate in the care of your patient.      Sincerely,    Wicho Barnett MD      "

## 2018-04-23 NOTE — PROGRESS NOTES
History: It is a great pleasure to see this very pleasant 32-year-old gentleman in initial consultation today.  He is in good health.  He wishes to have a vasectomy.  He is  with 2 young children.  His wife is in full agreement with the plans for vasectomy.  He does have mild hypertension but no other major health related issues.  There is no history of previous surgery significance in the lower abdomen    Past Medical History:   Diagnosis Date     NO ACTIVE PROBLEMS      Seasonal allergies        Past Surgical History:   Procedure Laterality Date     ENT SURGERY         Family History   Problem Relation Age of Onset     HEART DISEASE Mother      Hypertension Father      CANCER Maternal Grandmother      Hypertension Brother      Hypertension Sister        Social History     Social History     Marital status:      Spouse name: N/A     Number of children: N/A     Years of education: N/A     Occupational History     Not on file.     Social History Main Topics     Smoking status: Never Smoker     Smokeless tobacco: Never Used     Alcohol use Yes      Comment: OCC     Drug use: No     Sexual activity: Yes     Partners: Female     Other Topics Concern      Service No     Blood Transfusions No     Caffeine Concern No     Occupational Exposure No     Hobby Hazards No     Sleep Concern No     Stress Concern No     Weight Concern No     Special Diet No     Back Care No     Exercise Yes     Bike Helmet Yes     Seat Belt Yes     Self-Exams Yes     Social History Narrative       Current Outpatient Prescriptions   Medication Sig Dispense Refill     albuterol (PROAIR HFA/PROVENTIL HFA/VENTOLIN HFA) 108 (90 BASE) MCG/ACT Inhaler Inhale 2 puffs into the lungs every 6 hours as needed for shortness of breath / dyspnea or wheezing 2 Inhaler 1     cefuroxime (CEFTIN) 500 MG tablet Take 1 tablet (500 mg) by mouth 2 times daily Antibiotic for ear infection 20 tablet 0     diazepam (VALIUM) 10 MG tablet Take 1 tablet  "(10 mg) by mouth every 6 hours as needed for anxiety or sleep Take 30-60 minutes before procedure.  Do not operate a vehicle after taking this medication. 1 tablet 0     lisinopril (PRINIVIL/ZESTRIL) 10 MG tablet Take 1 tablet (10 mg) by mouth daily For blood pressure Pharmacy ok to hold prescription until due 90 tablet 1     triamcinolone (KENALOG) 0.5 % cream Apply sparingly to affected area three times daily. 30 g 1       Review Of Systems:  Skin: negative  Eyes: negative  Ears/Nose/Throat: negative  Respiratory: No shortness of breath, dyspnea on exertion, cough, or hemoptysis  Cardiovascular: Essential hypertension  Gastrointestinal: negative  Genitourinary: negative  Musculoskeletal: negative  Neurologic: negative  Psychiatric: negative  Hematologic/Lymphatic/Immunologic: negative  Endocrine: negative    Exam:  /66 (BP Location: Left arm)  Pulse 68  Ht 1.778 m (5' 10\")  Wt 77.1 kg (170 lb)  BMI 24.39 kg/m2    General Impression: Very pleasant young man in no acute distress, well oriented in time place and person.    Mental status.  Normal    HEENT: There is no clinical evidence of jaundice, the mucous membranes are normal.  The neck and thyroid glands are normal    Skin: Skin is otherwise normal to examination    Lymph Nodes: There is no inguinal lymphadenopathy    Respiratory System: The respiratory cycle is normal    Cardiovascular: Not examined    Abdominal: Unremarkable abdominal examination without evidence of inguinal hernia    Extremities: No significant peripheral edema    Back and Flank: Not examined    Genital: Penis uncircumcised with normal size meatus.  Both vas deferens easily palpable.  No other significant features    Rectal: Not examined    Neurologic: There are no focal abnormal clinical neurological signs in the central, or peripheral nervous systems    Impression: I discussed the procedure of vasectomy with the patient in detail today.  I went over all the potential side effects, " "complications and expectations from the procedure.  I explained the importance of not discontinuing normal contraceptive precautions until we have demonstrated a 0 sperm count after the procedure.  I answered all his questions    Plan: Vasectomy    \"This dictation was performed with voice recognition software and may contain errors,  omissions and inadvertent word substitution.\"    "

## 2018-05-09 NOTE — TELEPHONE ENCOUNTER
Patient is requesting a refill of his Lisinopril medication. Please call to advise. Thank you

## 2018-12-22 DIAGNOSIS — I10 HYPERTENSION, GOAL BELOW 140/90: ICD-10-CM

## 2018-12-26 RX ORDER — LISINOPRIL 10 MG/1
TABLET ORAL
Qty: 30 TABLET | Refills: 0 | Status: SHIPPED | OUTPATIENT
Start: 2018-12-26 | End: 2019-02-12

## 2019-02-12 ENCOUNTER — OFFICE VISIT (OUTPATIENT)
Dept: FAMILY MEDICINE | Facility: CLINIC | Age: 34
End: 2019-02-12
Payer: OTHER GOVERNMENT

## 2019-02-12 ENCOUNTER — OFFICE VISIT (OUTPATIENT)
Dept: BEHAVIORAL HEALTH | Facility: CLINIC | Age: 34
End: 2019-02-12

## 2019-02-12 VITALS
BODY MASS INDEX: 25.77 KG/M2 | HEIGHT: 70 IN | HEART RATE: 91 BPM | SYSTOLIC BLOOD PRESSURE: 146 MMHG | DIASTOLIC BLOOD PRESSURE: 88 MMHG | OXYGEN SATURATION: 96 % | WEIGHT: 180 LBS | TEMPERATURE: 97.8 F | RESPIRATION RATE: 18 BRPM

## 2019-02-12 DIAGNOSIS — I10 HYPERTENSION, GOAL BELOW 140/90: Primary | ICD-10-CM

## 2019-02-12 DIAGNOSIS — F43.22 ADJUSTMENT DISORDER WITH ANXIOUS MOOD: Primary | ICD-10-CM

## 2019-02-12 DIAGNOSIS — F43.9 STRESS: ICD-10-CM

## 2019-02-12 DIAGNOSIS — J45.21 MILD INTERMITTENT ASTHMA WITH ACUTE EXACERBATION: ICD-10-CM

## 2019-02-12 PROCEDURE — 99214 OFFICE O/P EST MOD 30 MIN: CPT | Performed by: PHYSICIAN ASSISTANT

## 2019-02-12 RX ORDER — ALBUTEROL SULFATE 90 UG/1
2 AEROSOL, METERED RESPIRATORY (INHALATION) EVERY 6 HOURS PRN
Qty: 2 INHALER | Refills: 1 | Status: SHIPPED | OUTPATIENT
Start: 2019-02-12

## 2019-02-12 RX ORDER — LISINOPRIL 10 MG/1
TABLET ORAL
Qty: 90 TABLET | Refills: 1 | Status: SHIPPED | OUTPATIENT
Start: 2019-02-12 | End: 2019-08-29

## 2019-02-12 ASSESSMENT — MIFFLIN-ST. JEOR: SCORE: 1757.72

## 2019-02-12 NOTE — LETTER
My Asthma Action Plan  Name: Julio Cardoza   YOB: 1985  Date: 2/12/2019   My doctor: Shadi Cabrera PA-C   My clinic: [facility]        My Control Medicine: albuterol  My Rescue Medicine: albuyerol   My Asthma Severity: intermittent  Avoid your asthma triggers: smoke, upper respiratory infections, dust mites, pollens and exercise or sports               GREEN ZONE   Good Control    I feel good    No cough or wheeze    Can work, sleep and play without asthma symptoms       Take your asthma control medicine every day.     1. If exercise triggers your asthma, take your rescue medication    15 minutes before exercise or sports, and    During exercise if you have asthma symptoms  2. Spacer to use with inhaler: If you have a spacer, make sure to use it with your inhaler             YELLOW ZONE Getting Worse  I have ANY of these:    I do not feel good    Cough or wheeze    Chest feels tight    Wake up at night   1. Keep taking your Green Zone medications  2. Start taking your rescue medicine:    every 20 minutes for up to 1 hour. Then every 4 hours for 24-48 hours.  3. If you stay in the Yellow Zone for more than 12-24 hours, contact your doctor.  4. If you do not return to the Green Zone in 12-24 hours or you get worse, start taking your oral steroid medicine if prescribed by your provider.           RED ZONE Medical Alert - Get Help  I have ANY of these:    I feel awful    Medicine is not helping    Breathing getting harder    Trouble walking or talking    Nose opens wide to breathe       1. Take your rescue medicine NOW  2. If your provider has prescribed an oral steroid medicine, start taking it NOW  3. Call your doctor NOW  4. If you are still in the Red Zone after 20 minutes and you have not reached your doctor:    Take your rescue medicine again and    Call 911 or go to the emergency room right away    See your regular doctor within 2 weeks of an Emergency Room or Urgent Care  visit for follow-up treatment.          Annual Reminders:  Meet with Asthma Educator,  Flu Shot in the Fall, consider Pneumonia Vaccination for patients with asthma (aged 19 and older).    Pharmacy:    [id number] [place] [facility] [place] - [address] [facility]  [id number] [place] [facility] [place], [place] - [address]                      Asthma Triggers  How To Control Things That Make Your Asthma Worse    Triggers are things that make your asthma worse.  Look at the list below to help you find your triggers and what you can do about them.  You can help prevent asthma flare-ups by staying away from your triggers.      Trigger                                                          What you can do   Cigarette Smoke  Tobacco smoke can make asthma worse. Do not allow smoking in your home, car or around you.  Be sure no one smokes at a child s day care or school.  If you smoke, ask your health care provider for ways to help you quit.  Ask family members to quit too.  Ask your health care provider for a referral to Quit Plan to help you quit smoking, or call 3-894-572-PLAN.     Colds, Flu, Bronchitis  These are common triggers of asthma. Wash your hands often.  Don t touch your eyes, nose or mouth.  Get a flu shot every year.     Dust Mites  These are tiny bugs that live in cloth or carpet. They are too small to see. Wash sheets and blankets in hot water every week.   Encase pillows and mattress in dust mite proof covers.  Avoid having carpet if you can. If you have carpet, vacuum weekly.   Use a dust mask and HEPA vacuum.   Pollen and Outdoor Mold  Some people are allergic to trees, grass, or weed pollen, or molds. Try to keep your windows closed.  Limit time out doors when pollen count is high.   Ask you health care provider about taking medicine during allergy season.     Animal Dander  Some people are allergic to skin flakes, urine or saliva from pets with fur or feathers. Keep pets with fur or feathers out of your  home.    If you can t keep the pet outdoors, then keep the pet out of your bedroom.  Keep the bedroom door closed.  Keep pets off cloth furniture and away from stuffed toys.     Mice, Rats, and Cockroaches  Some people are allergic to the waste from these pests.   Cover food and garbage.  Clean up spills and food crumbs.  Store grease in the refrigerator.   Keep food out of the bedroom.   Indoor Mold  This can be a trigger if your home has high moisture. Fix leaking faucets, pipes, or other sources of water.   Clean moldy surfaces.  Dehumidify basement if it is damp and smelly.   Smoke, Strong Odors, and Sprays  These can reduce air quality. Stay away from strong odors and sprays, such as perfume, powder, hair spray, paints, smoke incense, paint, cleaning products, candles and new carpet.   Exercise or Sports  Some people with asthma have this trigger. Be active!  Ask your doctor about taking medicine before sports or exercise to prevent symptoms.    Warm up for 5-10 minutes before and after sports or exercise.     Other Triggers of Asthma  Cold air:  Cover your nose and mouth with a scarf.  Sometimes laughing or crying can be a trigger.  Some medicines and food can trigger asthma.

## 2019-02-12 NOTE — PROGRESS NOTES
"SUBJECTIVE:                                                    Julio Cardoza is a 33 year old male who presents to clinic today for the following health issues:    Hypertension Follow-up    Answers for HPI/ROS submitted by the patient on 2/12/2019   Chronic problems general questions HPI Form  Outpatient blood pressures: are being checked  Dietary sodium intake:: No added salt diet  Blood pressures checked at: Home      Asthma Follow-Up    Was ACT completed today?    Yes    ACT Total Scores 2/12/2019   ACT TOTAL SCORE (Goal Greater than or Equal to 20) 21   In the past 12 months, how many times did you visit the emergency room for your asthma without being admitted to the hospital? 0   In the past 12 months, how many times were you hospitalized overnight because of your asthma? 0       Anxiety: \"long time\" he thought that it was related to blood pressure.   Getting job promotion but having to move out of state.     Problem list and histories reviewed & adjusted, as indicated.  Additional history: as documented    Patient Active Problem List   Diagnosis     Hyperlipidemia LDL goal <160     Hypertension, goal below 140/90     Allergic rhinitis, unspecified allergic rhinitis type     Mild intermittent asthma     Seasonal allergic rhinitis, unspecified chronicity, unspecified trigger     Past Surgical History:   Procedure Laterality Date     ENT SURGERY         Social History     Tobacco Use     Smoking status: Never Smoker     Smokeless tobacco: Never Used   Substance Use Topics     Alcohol use: Yes     Comment: OCC     Family History   Problem Relation Age of Onset     Heart Disease Mother      Hypertension Father      Cancer Maternal Grandmother      Hypertension Brother      Hypertension Sister          Current Outpatient Medications   Medication Sig Dispense Refill     albuterol (PROAIR HFA/PROVENTIL HFA/VENTOLIN HFA) 108 (90 Base) MCG/ACT inhaler Inhale 2 puffs into the lungs every 6 hours as needed for " "shortness of breath / dyspnea or wheezing 2 Inhaler 1     lisinopril (PRINIVIL/ZESTRIL) 10 MG tablet TAKE 1 TABLET (10 MG) BY MOUTH DAILY FOR BLOOD PRESSURE 90 tablet 1     triamcinolone (KENALOG) 0.5 % cream Apply sparingly to affected area three times daily. 30 g 1     Allergies   Allergen Reactions     Bactrim [Sulfamethoxazole W-Trimethoprim]      BP Readings from Last 3 Encounters:   02/12/19 146/88   04/23/18 124/66   03/13/18 138/85    Wt Readings from Last 3 Encounters:   02/12/19 81.6 kg (180 lb)   04/23/18 77.1 kg (170 lb)   03/13/18 77.1 kg (170 lb)            Labs reviewed in EPIC    ROS:  Constitutional, HEENT, cardiovascular, pulmonary, gi and gu systems are negative, except as otherwise noted.    OBJECTIVE:     /88   Pulse 91   Temp 97.8  F (36.6  C) (Oral)   Resp 18   Ht 1.778 m (5' 10\")   Wt 81.6 kg (180 lb)   SpO2 96%   BMI 25.83 kg/m    Body mass index is 25.83 kg/m .  GENERAL: healthy, alert and no distress  EYES: Eyes grossly normal to inspection, PERRL and conjunctivae and sclerae normal  HENT: ear canals and TM's normal, nose and mouth without ulcers or lesions  NECK: no adenopathy, no asymmetry, masses, or scars and thyroid normal to palpation  RESP: lungs clear to auscultation - no rales, rhonchi or wheezes  CV: regular rate and rhythm, normal S1 S2, no S3 or S4, no murmur, click or rub, no peripheral edema and peripheral pulses strong  NEURO: Normal strength and tone, mentation intact and speech normal  PSYCH: mentation appears normal, affect normal/bright    Diagnostic Test Results:  Future fasting pending    ASSESSMENT/PLAN:       ICD-10-CM    1. Hypertension, goal below 140/90 I10 lisinopril (PRINIVIL/ZESTRIL) 10 MG tablet     Lipid panel reflex to direct LDL Fasting     Basic metabolic panel     TSH   2. Mild intermittent asthma with acute exacerbation J45.21 albuterol (PROAIR HFA/PROVENTIL HFA/VENTOLIN HFA) 108 (90 Base) MCG/ACT inhaler   3. Stress F43.9    1. Get back on " lisinopril. Recheck blood pressure in 2 wks with fasting labs with pharmacy.  2. Ok for refills.   Recheck blood pressure in 6 months if at goal.   3. Warm clinic hand off to Denise ([facility])     Shadi Cabrera PA-C  [facility] [place]    Answers for HPI/ROS submitted by the patient on 2/12/2019   Chronic problems general questions HPI Form  Outpatient blood pressures: are being checked  Dietary sodium intake:: No added salt diet  Blood pressures checked at: Home

## 2019-02-12 NOTE — PROGRESS NOTES
Warm-handoff    BEHAVIORAL HEALTH CLINICIAN introduced and explained integrated health model, brief therapy interventions and referral and support services for ongoing therapy interventions. Patient experiencing increased stress/anxious moods due to life adjustments, got promotion for work and his family will be relocating to [place], and have 3rd baby on the way. Patient ready to start therapy services; will contact insurance to verify coverage then contact clinic to schedule.

## 2019-02-13 ASSESSMENT — ASTHMA QUESTIONNAIRES: ACT_TOTALSCORE: 21

## 2019-03-01 ENCOUNTER — ALLIED HEALTH/NURSE VISIT (OUTPATIENT)
Dept: NURSING | Facility: CLINIC | Age: 34
End: 2019-03-01
Payer: OTHER GOVERNMENT

## 2019-03-01 VITALS — HEART RATE: 74 BPM | SYSTOLIC BLOOD PRESSURE: 126 MMHG | DIASTOLIC BLOOD PRESSURE: 84 MMHG

## 2019-03-01 DIAGNOSIS — I10 HYPERTENSION, GOAL BELOW 140/90: ICD-10-CM

## 2019-03-01 DIAGNOSIS — Z01.30 BP CHECK: Primary | ICD-10-CM

## 2019-03-01 LAB
ANION GAP SERPL CALCULATED.3IONS-SCNC: 6 MMOL/L (ref 3–14)
BUN SERPL-MCNC: 12 MG/DL (ref 7–30)
CALCIUM SERPL-MCNC: 9 MG/DL (ref 8.5–10.1)
CHLORIDE SERPL-SCNC: 105 MMOL/L (ref 94–109)
CHOLEST SERPL-MCNC: 163 MG/DL
CO2 SERPL-SCNC: 27 MMOL/L (ref 20–32)
CREAT SERPL-MCNC: 1.07 MG/DL (ref 0.66–1.25)
GFR SERPL CREATININE-BSD FRML MDRD: 89 ML/MIN/{1.73_M2}
GLUCOSE SERPL-MCNC: 95 MG/DL (ref 70–99)
HDLC SERPL-MCNC: 40 MG/DL
LDLC SERPL CALC-MCNC: 106 MG/DL
NONHDLC SERPL-MCNC: 123 MG/DL
POTASSIUM SERPL-SCNC: 4.7 MMOL/L (ref 3.4–5.3)
SODIUM SERPL-SCNC: 138 MMOL/L (ref 133–144)
TRIGL SERPL-MCNC: 85 MG/DL
TSH SERPL DL<=0.005 MIU/L-ACNC: 1.75 MU/L (ref 0.4–4)

## 2019-03-01 PROCEDURE — 99207 ZZC NO CHARGE NURSE ONLY: CPT

## 2019-03-01 PROCEDURE — 36415 COLL VENOUS BLD VENIPUNCTURE: CPT | Performed by: PHYSICIAN ASSISTANT

## 2019-03-01 PROCEDURE — 84443 ASSAY THYROID STIM HORMONE: CPT | Performed by: PHYSICIAN ASSISTANT

## 2019-03-01 PROCEDURE — 80048 BASIC METABOLIC PNL TOTAL CA: CPT | Performed by: PHYSICIAN ASSISTANT

## 2019-03-01 PROCEDURE — 80061 LIPID PANEL: CPT | Performed by: PHYSICIAN ASSISTANT

## 2019-03-01 NOTE — PROGRESS NOTES
SUBJECTIVE:  Julio Cardoza is an 33 year old male who presents for a blood pressure check.    The reason for the visit is:  Per pt was told to F/U for med to see if it was working    The patient reports that he IS taking the medication as prescribed.     Current Outpatient Medications   Medication     albuterol (PROAIR HFA/PROVENTIL HFA/VENTOLIN HFA) 108 (90 Base) MCG/ACT inhaler     lisinopril (PRINIVIL/ZESTRIL) 10 MG tablet     triamcinolone (KENALOG) 0.5 % cream     No current facility-administered medications for this visit.        Allergies   Allergen Reactions     Bactrim [Sulfamethoxazole W-Trimethoprim]          OBJECTIVE:  Please get a blood pressure AND a pulse.  A height is also needed if has not been done in the past year.    There were no vitals taken for this visit.        If patient has diagnosis of HYPERTENSION, is there a goal on the problem list? Yes  Patient Active Problem List   Diagnosis     Hyperlipidemia LDL goal <160     Hypertension, goal below 140/90     Allergic rhinitis, unspecified allergic rhinitis type     Mild intermittent asthma     Seasonal allergic rhinitis, unspecified chronicity, unspecified trigger       Plan:    Systolic BP    Greater than or equal to 100  OR Less than  140    Diastolic BP    Greater than or equal to 70 OR less than 90    Pulse    Pulse greater than 60 or less than 100      If all the above three parameters are met, patient to go home. Chart CC to Primary Care Provider  If any one of the above three parameters is not met notify RN or provider.

## 2019-03-01 NOTE — LETTER
March 1, 2019    Julio Cardoza  [id number] [facility] [id number]        Mr. Cardoza,    All of your labs were normal/  near normal  for you.    Please contact the clinic if you have additional questions.  Thank you.    Sincerely,    Shadi Cabrera PA-C     Results for orders placed or performed in visit on 03/01/19   TSH   Result Value Ref Range    TSH 1.75 0.40 - 4.00 mU/L   Basic metabolic panel   Result Value Ref Range    Sodium 138 133 - 144 mmol/L    Potassium 4.7 3.4 - 5.3 mmol/L    Chloride 105 94 - 109 mmol/L    Carbon Dioxide 27 20 - 32 mmol/L    Anion Gap 6 3 - 14 mmol/L    Glucose 95 70 - 99 mg/dL    Urea Nitrogen 12 7 - 30 mg/dL    Creatinine 1.07 0.66 - 1.25 mg/dL    GFR Estimate 89 >60 mL/min/[1.73_m2]    GFR Estimate If Black >90 >60 mL/min/[1.73_m2]    Calcium 9.0 8.5 - 10.1 mg/dL   Lipid panel reflex to direct LDL Fasting   Result Value Ref Range    Cholesterol 163 <200 mg/dL    Triglycerides 85 <150 mg/dL    HDL Cholesterol 40 >39 mg/dL    LDL Cholesterol Calculated 106 (H) <100 mg/dL    Non HDL Cholesterol 123 <130 mg/dL

## 2019-03-01 NOTE — RESULT ENCOUNTER NOTE
Mr. Cardoza,    All of your labs were normal/  near normal  for you.    Please contact the clinic if you have additional questions.  Thank you.    Sincerely,    Shadi Cabrera PA-C

## 2019-08-29 DIAGNOSIS — I10 HYPERTENSION, GOAL BELOW 140/90: ICD-10-CM

## 2019-08-30 RX ORDER — LISINOPRIL 10 MG/1
TABLET ORAL
Qty: 90 TABLET | Refills: 1 | Status: SHIPPED | OUTPATIENT
Start: 2019-08-30